# Patient Record
Sex: MALE | Race: WHITE | HISPANIC OR LATINO | Employment: OTHER | URBAN - METROPOLITAN AREA
[De-identification: names, ages, dates, MRNs, and addresses within clinical notes are randomized per-mention and may not be internally consistent; named-entity substitution may affect disease eponyms.]

---

## 2017-05-24 ENCOUNTER — HOSPITAL ENCOUNTER (EMERGENCY)
Facility: HOSPITAL | Age: 32
Discharge: HOME | End: 2017-05-24
Attending: EMERGENCY MEDICINE

## 2017-05-24 VITALS
WEIGHT: 162 LBS | TEMPERATURE: 98 F | HEIGHT: 67 IN | OXYGEN SATURATION: 98 % | SYSTOLIC BLOOD PRESSURE: 126 MMHG | RESPIRATION RATE: 20 BRPM | HEART RATE: 78 BPM | BODY MASS INDEX: 25.43 KG/M2 | DIASTOLIC BLOOD PRESSURE: 82 MMHG

## 2017-05-24 DIAGNOSIS — S81.819A LACERATION OF LEG: Primary | ICD-10-CM

## 2017-05-24 PROCEDURE — 25000003 PHARM REV CODE 250: Performed by: PHYSICIAN ASSISTANT

## 2017-05-24 PROCEDURE — 12032 INTMD RPR S/A/T/EXT 2.6-7.5: CPT

## 2017-05-24 PROCEDURE — 12002 RPR S/N/AX/GEN/TRNK2.6-7.5CM: CPT

## 2017-05-24 PROCEDURE — 63600175 PHARM REV CODE 636 W HCPCS: Performed by: PHYSICIAN ASSISTANT

## 2017-05-24 PROCEDURE — 90471 IMMUNIZATION ADMIN: CPT | Performed by: PHYSICIAN ASSISTANT

## 2017-05-24 PROCEDURE — 99283 EMERGENCY DEPT VISIT LOW MDM: CPT | Mod: 25

## 2017-05-24 PROCEDURE — 90715 TDAP VACCINE 7 YRS/> IM: CPT | Performed by: PHYSICIAN ASSISTANT

## 2017-05-24 RX ORDER — HYDROCODONE BITARTRATE AND ACETAMINOPHEN 5; 325 MG/1; MG/1
1 TABLET ORAL EVERY 4 HOURS PRN
Qty: 12 TABLET | Refills: 0 | Status: SHIPPED | OUTPATIENT
Start: 2017-05-24 | End: 2017-05-31

## 2017-05-24 RX ORDER — HYDROCODONE BITARTRATE AND ACETAMINOPHEN 5; 325 MG/1; MG/1
1 TABLET ORAL
Status: COMPLETED | OUTPATIENT
Start: 2017-05-24 | End: 2017-05-24

## 2017-05-24 RX ORDER — CEPHALEXIN 500 MG/1
500 CAPSULE ORAL EVERY 12 HOURS
Qty: 20 CAPSULE | Refills: 0 | Status: SHIPPED | OUTPATIENT
Start: 2017-05-24 | End: 2017-05-29

## 2017-05-24 RX ORDER — LIDOCAINE HYDROCHLORIDE 10 MG/ML
10 INJECTION INFILTRATION; PERINEURAL
Status: COMPLETED | OUTPATIENT
Start: 2017-05-24 | End: 2017-05-24

## 2017-05-24 RX ADMIN — LIDOCAINE HYDROCHLORIDE 10 ML: 10 INJECTION, SOLUTION INFILTRATION; PERINEURAL at 11:05

## 2017-05-24 RX ADMIN — CLOSTRIDIUM TETANI TOXOID ANTIGEN (FORMALDEHYDE INACTIVATED), CORYNEBACTERIUM DIPHTHERIAE TOXOID ANTIGEN (FORMALDEHYDE INACTIVATED), BORDETELLA PERTUSSIS TOXOID ANTIGEN (GLUTARALDEHYDE INACTIVATED), BORDETELLA PERTUSSIS FILAMENTOUS HEMAGGLUTININ ANTIGEN (FORMALDEHYDE INACTIVATED), BORDETELLA PERTUSSIS PERTACTIN ANTIGEN, AND BORDETELLA PERTUSSIS FIMBRIAE 2/3 ANTIGEN 0.5 ML: 5; 2; 2.5; 5; 3; 5 INJECTION, SUSPENSION INTRAMUSCULAR at 11:05

## 2017-05-24 RX ADMIN — HYDROCODONE BITARTRATE AND ACETAMINOPHEN 1 TABLET: 5; 325 TABLET ORAL at 11:05

## 2017-05-24 NOTE — ED PROVIDER NOTES
Encounter Date: 5/24/2017       History     Chief Complaint   Patient presents with    Laceration     pt has large laceration noted to R medial knee from knife.       Review of patient's allergies indicates:  No Known Allergies      Ernst Su 31 y.o. male with no past medical history presented to the ED with c/o laceration to the right lower extremity that occurred just prior to arrival.  He reports that he was using a knife to cut materials at work.  He states that the knife was new and clean.  He reports some bleeding at the site that was controlled prior to arrival.  He denies any numbness, tingling, pain radiation, decreased range of motion.       The history is provided by the patient. The history is limited by a language barrier. A  was used.     History reviewed. No pertinent past medical history.  History reviewed. No pertinent surgical history.  History reviewed. No pertinent family history.  Social History   Substance Use Topics    Smoking status: Current Some Day Smoker    Smokeless tobacco: Not on file    Alcohol use Not on file     Review of Systems   Constitutional: Negative for chills and fever.   Respiratory: Negative for shortness of breath.    Cardiovascular: Negative for chest pain and leg swelling.   Musculoskeletal: Negative for arthralgias, back pain, joint swelling and myalgias.   Skin: Positive for wound. Negative for rash.        Laceration to the right medial knee   Neurological: Negative for weakness and numbness.   Hematological: Negative for adenopathy. Does not bruise/bleed easily.       Physical Exam     Initial Vitals [05/24/17 1112]   BP Pulse Resp Temp SpO2   127/80 78 16 97.7 °F (36.5 °C) 100 %     Physical Exam    Nursing note and vitals reviewed.  Constitutional: Vital signs are normal. He appears well-developed and well-nourished. He is cooperative.  Non-toxic appearance. He does not appear ill. No distress.   HENT:   Head: Normocephalic and  atraumatic.   Eyes: Conjunctivae and lids are normal.   Neck: Normal range of motion. Neck supple.   Cardiovascular: Normal rate and regular rhythm.   Abdominal: Soft. Normal appearance.   Musculoskeletal: Normal range of motion.        Right knee: He exhibits laceration. He exhibits normal range of motion, no swelling, no effusion, no ecchymosis, no erythema, no LCL laxity, normal patellar mobility, no bony tenderness and no MCL laxity.        Legs:  3 cm linear gaping laceration to the right medial knee with well approximated edges, no foreign body, no tendon, muscle or  obvious joint involvement at this time.  No active bleeding   Neurological: He is alert and oriented to person, place, and time. He has normal strength. No sensory deficit. GCS eye subscore is 4. GCS verbal subscore is 5. GCS motor subscore is 6.   Skin: Skin is warm, dry and intact. No rash noted. No erythema.   Psychiatric: He has a normal mood and affect. His speech is normal and behavior is normal. Thought content normal.         ED Course   Lac Repair  Date/Time: 5/24/2017 2:14 PM  Performed by: SHAYNE WITT  Authorized by: SALUD CROSS   Consent Done: Yes  Consent given by: patient  Patient understanding: patient states understanding of the procedure being performed  Patient identity confirmed: verbally with patient  Body area: lower extremity  Location details: right knee  Laceration length: 3 cm  Foreign bodies: no foreign bodies  Tendon involvement: none  Nerve involvement: none  Anesthesia: local infiltration    Anesthesia:  Anesthesia: local infiltration  Local Anesthetic: lidocaine 1% without epinephrine   Anesthetic total: 4 mL  Patient sedated: no  Preparation: Patient was prepped and draped in the usual sterile fashion.  Irrigation solution: saline  Irrigation method: syringe  Amount of cleaning: standard  Debridement: none  Degree of undermining: none  Skin closure: Ethilon (4-0)  Subcutaneous closure: 4-0 Vicryl  Number  of sutures: 15  Technique: simple  Approximation: close  Approximation difficulty: simple  Dressing: 4x4 sterile gauze  Patient tolerance: Patient tolerated the procedure well with no immediate complications        Labs Reviewed - No data to display      Imaging Results          X-Ray Knee 3 View Right (Final result)  Result time 05/24/17 12:06:01    Final result by Jeanie Salazar MD (05/24/17 12:06:01)                 Impression:      No acute osseous abnormality identified.      Electronically signed by: JEANIE SALAZAR MD  Date:     05/24/17  Time:    12:06              Narrative:    Right knee 3 views.  Comparison: None.    No evidence of fracture, dislocation, or osseous destructive process.  Joint spaces are maintained.  Small suprapatellar joint effusion visualized.                                  Ernst Su 31 y.o. male with no past medical history presented to the ED with c/o laceration to the right lower extremity that occurred just prior to arrival.  He reports that he was using a knife to cut materials at work.  He states that the knife was new and clean.  He reports some bleeding at the site that was controlled prior to arrival.  He denies any numbness, tingling, pain radiation, decreased range of motion.  He did not take any medications for the pain.  He is not up-to-date with tetanus.  ROS positive for laceration.  Physical exam reveals patient in minimal distress with 3 cm laceration on the right superior, medial knee with no active bleeding, tendon or joint involvement and well approximated edges. FROM of all joints on affected extremities, sensation and capillary refill intact.     DDX: laceration simple versus complicated, intra-articular laceration    ED management: wound cleaned and irrigated, lac repair; see procedure note.  Norco with reduction of pain.  X-ray with no obvious articular involvement, no visualized tendon on exam and full range of motion of the knee with no serous drainage.   Tetanus Updated in the ED.  We will send home on prophylactic antibiotics due to the size and location of the laceration.  He was instructed to keep the area clean and dry.        Impression/Plan: The encounter diagnosis was Laceration of leg.  Patient will follow up with Primaryfor suture removal .  Patient cautioned on when to return to ED.  Pt. Understands and agrees with current treatment plan                       Attending Attestation:     Physician Attestation Statement for NP/PA:   I discussed this assessment and plan of this patient with the NP/PA, but I did not personally examine the patient. The face to face encounter was performed by the NP/PA.    Other NP/PA Attestation Additions:      Medical Decision Making: Pt presented with leg laceration. No joint involvement per APC exam. Lac repaired by APC without complication. Sutures to be removed by PCP in 10 days or return for any sign of infection.                 ED Course     Clinical Impression:   The encounter diagnosis was Laceration of leg.    Disposition:   Disposition: Discharged  Condition: Stable       ROGER Mckeon  05/24/17 1411       Merlin Cerda MD  05/28/17 3714

## 2017-05-24 NOTE — ED NOTES
Pt brought to room, bleeding to right knee in controlled at this time. Savana DURAN in to evaluate.

## 2017-05-24 NOTE — ED NOTES
Pt given prescriptions and discharge instructions. Verbalizes understanding. Will get antibiotic filled today. Will return in 7 days to get his stitches out.

## 2017-05-31 ENCOUNTER — HOSPITAL ENCOUNTER (EMERGENCY)
Facility: HOSPITAL | Age: 32
Discharge: HOME | End: 2017-05-31
Attending: EMERGENCY MEDICINE

## 2017-05-31 VITALS
TEMPERATURE: 98 F | SYSTOLIC BLOOD PRESSURE: 116 MMHG | HEIGHT: 67 IN | WEIGHT: 162 LBS | HEART RATE: 65 BPM | RESPIRATION RATE: 16 BRPM | OXYGEN SATURATION: 100 % | DIASTOLIC BLOOD PRESSURE: 64 MMHG | BODY MASS INDEX: 25.43 KG/M2

## 2017-05-31 DIAGNOSIS — Z51.89 ENCOUNTER FOR WOUND RE-CHECK: Primary | ICD-10-CM

## 2017-05-31 PROCEDURE — 99283 EMERGENCY DEPT VISIT LOW MDM: CPT

## 2017-05-31 NOTE — ED PROVIDER NOTES
Encounter Date: 5/31/2017       History     Chief Complaint   Patient presents with    Suture / Staple Removal     left leg     Review of patient's allergies indicates:  No Known Allergies  31-year-old male is here for suture removal.  Patient had 12 sutures placed in this ED 7 days ago.  Patient reports he has been taking his oral antibiotics as directed.  He has had no problems with the wound.  He reports the pain has improved and he is an note drainage from the wound.      The history is provided by the patient.   Wound Check    He was treated in the ED 5 to 10 days ago. Treatments since wound repair include oral antibiotics and regular peroxide and water cleansings. There has been no drainage from the wound. There is no redness present. There is no swelling present. The pain has no pain. He has no difficulty moving the affected extremity or digit.     History reviewed. No pertinent past medical history.  History reviewed. No pertinent surgical history.  No family history on file.  Social History   Substance Use Topics    Smoking status: Current Some Day Smoker    Smokeless tobacco: Not on file    Alcohol use Not on file     Review of Systems   Constitutional: Negative for chills and fever.   HENT: Negative for congestion.    Respiratory: Negative for cough.    Cardiovascular: Negative for chest pain.   Gastrointestinal: Negative for abdominal pain, diarrhea, nausea and vomiting.   Musculoskeletal: Negative for arthralgias, back pain, joint swelling, myalgias and neck pain.   Skin: Positive for wound.   Allergic/Immunologic: Negative for immunocompromised state.   Neurological: Negative for weakness and headaches.   Psychiatric/Behavioral: Negative for confusion.   All other systems reviewed and are negative.      Physical Exam     Initial Vitals [05/31/17 1521]   BP Pulse Resp Temp SpO2   116/64 65 16 97.8 °F (36.6 °C) 100 %     Physical Exam    Nursing note and vitals reviewed.  Constitutional: Vital signs  are normal. He appears well-developed and well-nourished. He is active and cooperative. He is easily aroused.  Non-toxic appearance. He does not have a sickly appearance. He does not appear ill. No distress.   HENT:   Head: Normocephalic and atraumatic.   Eyes: Conjunctivae are normal.   Neck: Normal range of motion.   Cardiovascular: Normal rate.   Pulmonary/Chest: Effort normal.   Abdominal: Soft. Normal appearance and bowel sounds are normal. There is no tenderness.   Neurological: He is alert, oriented to person, place, and time and easily aroused. He has normal strength. No sensory deficit. GCS eye subscore is 4. GCS verbal subscore is 5. GCS motor subscore is 6.   Skin: Skin is warm and dry. Laceration noted. No rash noted.   Sutured laceration to right inner thigh.  Wound without signs of infection or drainage.  No tenderness to palpation.   Psychiatric: He has a normal mood and affect. His speech is normal and behavior is normal. Judgment and thought content normal. Cognition and memory are normal.         ED Course   Suture Removal  Date/Time: 5/31/2017 4:10 PM  Location procedure was performed: [facility] EMERGENCY DEPARTMENT  Performed by: MAVERICK CASTILLO  Authorized by: LEFORT, GUY J.   Body area: lower extremity  Location details: right upper leg  Description of findings: Upon removal of two sutures, wound dehisced to superficial laceration.    Wound Appearance: clean, well healed, normal color, nontender and no drainage  Sutures Removed: 2  Post-removal: Steri-Strips applied and dressing applied  Facility: sutures placed in this facility  Complications: No  Estimated blood loss (mL): 0  Specimens: No  Implants: No  Patient tolerance: Patient tolerated the procedure well with no immediate complications  Comments: Advised leaving the sutures in for two additional days to allow for complete healing.  Remove the 10 additional sutures in 2 days. Advised continued suture care.          Labs Reviewed - No  data to display          Medical Decision Making:   Initial Assessment:   31-year-old male here for suture removal.  Patient is on antibiotics, take as directed.  No signs of infection at wound.  Wound appears well-healed.  Differential Diagnosis:   Wound check, suture removal, infection  ED Management:  2 stitches removed and wound dehisced minimally.  Advised leaving the remaining sutures in for an additional 2 days.    Advise wound recheck in 2 days for likely suture removal.  Advised continued wound care.  Advised continued use of the antibiotics as directed.  Reviewed signs and symptoms of infection.  Patient verbalized understanding, compliance, and agreement with the treatment plan.                   ED Course     Clinical Impression:   The encounter diagnosis was Encounter for wound re-check.          Kira Logan, CAITLIN  05/31/17 3415

## 2017-06-02 ENCOUNTER — HOSPITAL ENCOUNTER (EMERGENCY)
Facility: HOSPITAL | Age: 32
Discharge: HOME | End: 2017-06-02
Attending: EMERGENCY MEDICINE

## 2017-06-02 VITALS
DIASTOLIC BLOOD PRESSURE: 67 MMHG | SYSTOLIC BLOOD PRESSURE: 119 MMHG | OXYGEN SATURATION: 99 % | HEART RATE: 76 BPM | RESPIRATION RATE: 16 BRPM | TEMPERATURE: 98 F

## 2017-06-02 DIAGNOSIS — S81.811A LEG LACERATION, RIGHT, INITIAL ENCOUNTER: ICD-10-CM

## 2017-06-02 DIAGNOSIS — Z48.02 VISIT FOR SUTURE REMOVAL: Primary | ICD-10-CM

## 2017-06-02 PROCEDURE — 99281 EMR DPT VST MAYX REQ PHY/QHP: CPT

## 2017-06-02 RX ORDER — HYDROCODONE BITARTRATE AND ACETAMINOPHEN 5; 325 MG/1; MG/1
1 TABLET ORAL EVERY 6 HOURS PRN
COMMUNITY

## 2017-06-02 NOTE — ED NOTES
Pt has sutures noted to R medial leg, near knee.  No drainage or swelling noted to site.  Dr. Castellanos at bedside to remove sutures.

## 2017-06-03 NOTE — ED PROVIDER NOTES
Chief complaint:  Suture / Staple Removal (R inner knee)      HPI:  Ernst Su is a 31 y.o. male presenting with  acute onset of having had a laceration to his right leg after he actually cut himself with a .  He is here for suture removal.  No fevers or chills.  No redness or swelling.    ROS: As per HPI and below:  Constitutional:  No fevers, no chills  Skin: Laceration to right leg  Heme: no bleeding  Musculoskeletal: no joint pain  Neuro: no focal numbness, no focal weakness        Review of patient's allergies indicates:  No Known Allergies    No current facility-administered medications on file prior to encounter.      No current outpatient prescriptions on file prior to encounter.       PMH:  As per HPI and below:  History reviewed. No pertinent past medical history.  History reviewed. No pertinent surgical history.    Social History     Social History    Marital status: Single     Spouse name: N/A    Number of children: N/A    Years of education: N/A     Social History Main Topics    Smoking status: Current Some Day Smoker    Smokeless tobacco: None    Alcohol use None    Drug use: Unknown    Sexual activity: Not Asked     Other Topics Concern    None     Social History Narrative    None       History reviewed. No pertinent family history.    Physical Exam:    Vitals:    06/02/17 1619   BP: 119/67   Pulse: 76   Resp: 16   Temp: 98.1 °F (36.7 °C)     Constitutional: Well-nourished, well-developed, in no acute distress, not cachectic  Musculoskeletal: Normal range of motion, no obvious deformity, normal capillary refill, head atraumatic, neck supple, no meningismus  Skin: 6 cm laceration to right distal medial thigh that is well-healed without erythema or exudate, sutures removed without difficulty.  Neurologic: Cranial nerves II through XII intact, no motor deficits, no sensory deficits, no cerebellar deficits  Psychological: Alert, oriented x3, normal affect, normal mood    No orders of  the defined types were placed in this encounter.      Medications - No data to display      Labs Reviewed - No data to display                ASSESSMENT  1. Visit for suture removal    2. Leg laceration, right, initial encounter          Disposition:    Discharge    Discharge Medication List as of 6/2/2017  4:40 PM        Discharge Medication List as of 6/2/2017  4:40 PM        Discharge Medication List as of 6/2/2017  4:40 PM        MDM  Number of Diagnoses or Management Options  Leg laceration, right, initial encounter:   Visit for suture removal:   Diagnosis management comments: Patient presents for suture removal of right leg.  Laceration is well-healed.  Sutures removed without difficulty.  We'll discharge home       Amount and/or Complexity of Data Reviewed  Decide to obtain previous medical records or to obtain history from someone other than the patient: yes           Murray Castellanos III, MD  06/02/17 3743

## 2017-07-22 ENCOUNTER — HOSPITAL ENCOUNTER (INPATIENT)
Facility: HOSPITAL | Age: 32
LOS: 1 days | Discharge: HOME | DRG: 683 | End: 2017-07-23
Attending: EMERGENCY MEDICINE | Admitting: HOSPITALIST

## 2017-07-22 DIAGNOSIS — N17.9 AKI (ACUTE KIDNEY INJURY): Primary | ICD-10-CM

## 2017-07-22 DIAGNOSIS — R11.10 VOMITING, INTRACTABILITY OF VOMITING NOT SPECIFIED, PRESENCE OF NAUSEA NOT SPECIFIED, UNSPECIFIED VOMITING TYPE: ICD-10-CM

## 2017-07-22 DIAGNOSIS — R10.13 EPIGASTRIC PAIN: ICD-10-CM

## 2017-07-22 PROBLEM — R10.9 ABDOMINAL PAIN: Status: ACTIVE | Noted: 2017-07-22

## 2017-07-22 PROBLEM — E87.1 HYPONATREMIA: Status: ACTIVE | Noted: 2017-07-22

## 2017-07-22 PROBLEM — E87.8 HYPOCHLOREMIA: Status: ACTIVE | Noted: 2017-07-22

## 2017-07-22 PROBLEM — R74.8 ELEVATED CPK: Status: ACTIVE | Noted: 2017-07-22

## 2017-07-22 PROBLEM — R11.2 INTRACTABLE VOMITING WITH NAUSEA: Status: ACTIVE | Noted: 2017-07-22

## 2017-07-22 LAB
ALBUMIN SERPL BCP-MCNC: 5.2 G/DL
ALP SERPL-CCNC: 75 U/L
ALT SERPL W/O P-5'-P-CCNC: 13 U/L
ANION GAP SERPL CALC-SCNC: 12 MMOL/L
ANION GAP SERPL CALC-SCNC: 22 MMOL/L
APTT BLDCRRT: 30.1 SEC
AST SERPL-CCNC: 20 U/L
BACTERIA #/AREA URNS HPF: ABNORMAL /HPF
BASOPHILS # BLD AUTO: 0.04 K/UL
BASOPHILS NFR BLD: 0.5 %
BILIRUB SERPL-MCNC: 1.5 MG/DL
BILIRUB UR QL STRIP: NEGATIVE
BUN SERPL-MCNC: 110 MG/DL
BUN SERPL-MCNC: 66 MG/DL
CALCIUM SERPL-MCNC: 10.5 MG/DL
CALCIUM SERPL-MCNC: 9.8 MG/DL
CHLORIDE SERPL-SCNC: 72 MMOL/L
CHLORIDE SERPL-SCNC: 86 MMOL/L
CK SERPL-CCNC: 204 U/L
CLARITY UR: CLEAR
CO2 SERPL-SCNC: 31 MMOL/L
CO2 SERPL-SCNC: 32 MMOL/L
COLOR UR: YELLOW
CREAT SERPL-MCNC: 2.9 MG/DL
CREAT SERPL-MCNC: 7.4 MG/DL
CREAT UR-MCNC: 92.1 MG/DL
DIFFERENTIAL METHOD: ABNORMAL
EOSINOPHIL # BLD AUTO: 0 K/UL
EOSINOPHIL NFR BLD: 0.2 %
ERYTHROCYTE [DISTWIDTH] IN BLOOD BY AUTOMATED COUNT: 12.5 %
EST. GFR  (AFRICAN AMERICAN): 10 ML/MIN/1.73 M^2
EST. GFR  (AFRICAN AMERICAN): 32 ML/MIN/1.73 M^2
EST. GFR  (NON AFRICAN AMERICAN): 28 ML/MIN/1.73 M^2
EST. GFR  (NON AFRICAN AMERICAN): 9 ML/MIN/1.73 M^2
ESTIMATED AVG GLUCOSE: 94 MG/DL
GLUCOSE SERPL-MCNC: 109 MG/DL
GLUCOSE SERPL-MCNC: 95 MG/DL
GLUCOSE UR QL STRIP: NEGATIVE
GRAN CASTS #/AREA URNS LPF: 1 /LPF
HBA1C MFR BLD HPLC: 4.9 %
HCT VFR BLD AUTO: 48.2 %
HGB BLD-MCNC: 17.5 G/DL
HGB UR QL STRIP: ABNORMAL
HYALINE CASTS #/AREA URNS LPF: 5 /LPF
INR PPP: 0.9
KETONES UR QL STRIP: NEGATIVE
LEUKOCYTE ESTERASE UR QL STRIP: NEGATIVE
LIPASE SERPL-CCNC: 30 U/L
LYMPHOCYTES # BLD AUTO: 1.9 K/UL
LYMPHOCYTES NFR BLD: 23.7 %
MAGNESIUM SERPL-MCNC: 2.7 MG/DL
MCH RBC QN AUTO: 31.5 PG
MCHC RBC AUTO-ENTMCNC: 36.3 G/DL
MCV RBC AUTO: 87 FL
MICROSCOPIC COMMENT: ABNORMAL
MONOCYTES # BLD AUTO: 1.2 K/UL
MONOCYTES NFR BLD: 15.1 %
NEUTROPHILS # BLD AUTO: 4.8 K/UL
NEUTROPHILS NFR BLD: 60.4 %
NITRITE UR QL STRIP: NEGATIVE
PH UR STRIP: 6 [PH] (ref 5–8)
PHOSPHATE SERPL-MCNC: 4.2 MG/DL
PLATELET # BLD AUTO: 406 K/UL
PMV BLD AUTO: 9.3 FL
POTASSIUM SERPL-SCNC: 3.6 MMOL/L
POTASSIUM SERPL-SCNC: 4 MMOL/L
PROT SERPL-MCNC: 9.8 G/DL
PROT UR QL STRIP: ABNORMAL
PROT UR-MCNC: 17 MG/DL
PROT/CREAT RATIO, UR: 0.18
PROTHROMBIN TIME: 10.1 SEC
RBC # BLD AUTO: 5.56 M/UL
RBC #/AREA URNS HPF: 1 /HPF (ref 0–4)
SODIUM SERPL-SCNC: 125 MMOL/L
SODIUM SERPL-SCNC: 130 MMOL/L
SODIUM UR-SCNC: <20 MMOL/L
SODIUM UR-SCNC: <20 MMOL/L
SP GR UR STRIP: 1.01 (ref 1–1.03)
SQUAMOUS #/AREA URNS HPF: 0 /HPF
TROPONIN I SERPL DL<=0.01 NG/ML-MCNC: <0.006 NG/ML
TROPONIN I SERPL DL<=0.01 NG/ML-MCNC: <0.006 NG/ML
TSH SERPL DL<=0.005 MIU/L-ACNC: 0.68 UIU/ML
URN SPEC COLLECT METH UR: ABNORMAL
UROBILINOGEN UR STRIP-ACNC: NEGATIVE EU/DL
WBC # BLD AUTO: 8.02 K/UL
WBC #/AREA URNS HPF: 1 /HPF (ref 0–5)
YEAST URNS QL MICRO: ABNORMAL

## 2017-07-22 PROCEDURE — 85730 THROMBOPLASTIN TIME PARTIAL: CPT

## 2017-07-22 PROCEDURE — 83735 ASSAY OF MAGNESIUM: CPT

## 2017-07-22 PROCEDURE — 84484 ASSAY OF TROPONIN QUANT: CPT | Mod: 91

## 2017-07-22 PROCEDURE — 85610 PROTHROMBIN TIME: CPT

## 2017-07-22 PROCEDURE — 84300 ASSAY OF URINE SODIUM: CPT

## 2017-07-22 PROCEDURE — 93005 ELECTROCARDIOGRAM TRACING: CPT

## 2017-07-22 PROCEDURE — 80048 BASIC METABOLIC PNL TOTAL CA: CPT

## 2017-07-22 PROCEDURE — 25000003 PHARM REV CODE 250: Performed by: EMERGENCY MEDICINE

## 2017-07-22 PROCEDURE — 86335 IMMUNFIX E-PHORSIS/URINE/CSF: CPT

## 2017-07-22 PROCEDURE — 83690 ASSAY OF LIPASE: CPT

## 2017-07-22 PROCEDURE — 94761 N-INVAS EAR/PLS OXIMETRY MLT: CPT

## 2017-07-22 PROCEDURE — 84100 ASSAY OF PHOSPHORUS: CPT

## 2017-07-22 PROCEDURE — 84156 ASSAY OF PROTEIN URINE: CPT

## 2017-07-22 PROCEDURE — 25000003 PHARM REV CODE 250: Performed by: INTERNAL MEDICINE

## 2017-07-22 PROCEDURE — 94760 N-INVAS EAR/PLS OXIMETRY 1: CPT

## 2017-07-22 PROCEDURE — 25000003 PHARM REV CODE 250: Performed by: NURSE PRACTITIONER

## 2017-07-22 PROCEDURE — 99285 EMERGENCY DEPT VISIT HI MDM: CPT | Mod: 25

## 2017-07-22 PROCEDURE — 63600175 PHARM REV CODE 636 W HCPCS: Performed by: EMERGENCY MEDICINE

## 2017-07-22 PROCEDURE — 84484 ASSAY OF TROPONIN QUANT: CPT

## 2017-07-22 PROCEDURE — 81000 URINALYSIS NONAUTO W/SCOPE: CPT

## 2017-07-22 PROCEDURE — 85025 COMPLETE CBC W/AUTO DIFF WBC: CPT

## 2017-07-22 PROCEDURE — 36415 COLL VENOUS BLD VENIPUNCTURE: CPT

## 2017-07-22 PROCEDURE — 83036 HEMOGLOBIN GLYCOSYLATED A1C: CPT

## 2017-07-22 PROCEDURE — 96361 HYDRATE IV INFUSION ADD-ON: CPT

## 2017-07-22 PROCEDURE — 82550 ASSAY OF CK (CPK): CPT

## 2017-07-22 PROCEDURE — 80053 COMPREHEN METABOLIC PANEL: CPT

## 2017-07-22 PROCEDURE — 11000001 HC ACUTE MED/SURG PRIVATE ROOM

## 2017-07-22 PROCEDURE — 96374 THER/PROPH/DIAG INJ IV PUSH: CPT

## 2017-07-22 PROCEDURE — 84443 ASSAY THYROID STIM HORMONE: CPT

## 2017-07-22 RX ORDER — FAMOTIDINE 20 MG/1
20 TABLET, FILM COATED ORAL
Status: COMPLETED | OUTPATIENT
Start: 2017-07-22 | End: 2017-07-22

## 2017-07-22 RX ORDER — FAMOTIDINE 20 MG/1
20 TABLET, FILM COATED ORAL 2 TIMES DAILY
Status: DISCONTINUED | OUTPATIENT
Start: 2017-07-22 | End: 2017-07-23 | Stop reason: HOSPADM

## 2017-07-22 RX ORDER — SODIUM CHLORIDE 9 MG/ML
INJECTION, SOLUTION INTRAVENOUS CONTINUOUS
Status: DISCONTINUED | OUTPATIENT
Start: 2017-07-22 | End: 2017-07-23

## 2017-07-22 RX ORDER — ONDANSETRON 2 MG/ML
4 INJECTION INTRAMUSCULAR; INTRAVENOUS EVERY 12 HOURS PRN
Status: DISCONTINUED | OUTPATIENT
Start: 2017-07-22 | End: 2017-07-23 | Stop reason: HOSPADM

## 2017-07-22 RX ORDER — ACETAMINOPHEN 325 MG/1
650 TABLET ORAL EVERY 6 HOURS PRN
Status: DISCONTINUED | OUTPATIENT
Start: 2017-07-22 | End: 2017-07-23 | Stop reason: HOSPADM

## 2017-07-22 RX ORDER — SODIUM CHLORIDE AND POTASSIUM CHLORIDE 150; 900 MG/100ML; MG/100ML
INJECTION, SOLUTION INTRAVENOUS CONTINUOUS
Status: DISCONTINUED | OUTPATIENT
Start: 2017-07-22 | End: 2017-07-23

## 2017-07-22 RX ORDER — MORPHINE SULFATE 2 MG/ML
2 INJECTION, SOLUTION INTRAMUSCULAR; INTRAVENOUS EVERY 4 HOURS PRN
Status: DISCONTINUED | OUTPATIENT
Start: 2017-07-22 | End: 2017-07-23 | Stop reason: HOSPADM

## 2017-07-22 RX ORDER — ONDANSETRON 2 MG/ML
8 INJECTION INTRAMUSCULAR; INTRAVENOUS
Status: COMPLETED | OUTPATIENT
Start: 2017-07-22 | End: 2017-07-22

## 2017-07-22 RX ADMIN — FAMOTIDINE 20 MG: 20 TABLET, FILM COATED ORAL at 05:07

## 2017-07-22 RX ADMIN — FAMOTIDINE 20 MG: 20 TABLET ORAL at 08:07

## 2017-07-22 RX ADMIN — SODIUM CHLORIDE 1000 ML: 0.9 INJECTION, SOLUTION INTRAVENOUS at 04:07

## 2017-07-22 RX ADMIN — SODIUM CHLORIDE AND POTASSIUM CHLORIDE: 9; 1.49 INJECTION, SOLUTION INTRAVENOUS at 04:07

## 2017-07-22 RX ADMIN — FAMOTIDINE 20 MG: 20 TABLET ORAL at 10:07

## 2017-07-22 RX ADMIN — ONDANSETRON 8 MG: 2 INJECTION INTRAMUSCULAR; INTRAVENOUS at 04:07

## 2017-07-22 RX ADMIN — LIDOCAINE HYDROCHLORIDE: 20 SOLUTION ORAL; TOPICAL at 05:07

## 2017-07-22 RX ADMIN — SODIUM CHLORIDE 1000 ML: 0.9 INJECTION, SOLUTION INTRAVENOUS at 05:07

## 2017-07-22 RX ADMIN — SODIUM CHLORIDE: 0.9 INJECTION, SOLUTION INTRAVENOUS at 08:07

## 2017-07-22 NOTE — ED PROVIDER NOTES
"Encounter Date: 7/22/2017       History     Chief Complaint   Patient presents with    Emesis     X 4 days. multiple times each day. States "I can't eat/ drink anything because it comes back up"; reports abd pain. reports vomiting has made pain worse.      History was obtained using a .    Patient is a 32-year-old  male with no significant past medical history who presents to emergency department for evaluation of midepigastric abdominal pain associated with nausea and vomiting for the past 4 days.  He denies any bloody emesis or coffee-ground emesis.  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  He feels like his symptoms started a few days ago and may be related to food.  He drinks 2 beers a day but denies any other excessive alcohol intake.  He has no history of similar symptoms.  He states he's been vomiting numerous times a day but denies any diarrhea.  He has no chest pain flank pain radiation of pain to the back.  He still making urine.  Patient states he has mild urinary hesitancy and that it burns when he urinates.  He states he's been vomiting approximately 15 times a day.          Review of patient's allergies indicates:  No Known Allergies  No past medical history on file.  No past surgical history on file.  No family history on file.  Social History   Substance Use Topics    Smoking status: Current Some Day Smoker    Smokeless tobacco: Not on file    Alcohol use Not on file     Review of Systems   Constitutional: Negative for fever.   HENT: Negative for ear pain, rhinorrhea and sore throat.    Eyes: Negative for pain and visual disturbance.   Respiratory: Negative for cough and shortness of breath.    Cardiovascular: Negative for chest pain.   Gastrointestinal: Positive for abdominal pain and vomiting. Negative for diarrhea and nausea.   Genitourinary: Positive for dysuria. Negative for difficulty urinating, flank pain, penile pain, " penile swelling and scrotal swelling.   Musculoskeletal: Negative for arthralgias, back pain and myalgias.   Skin: Negative for rash.   Neurological: Negative for weakness, numbness and headaches.   All other systems reviewed and are negative.      Physical Exam     Initial Vitals [07/22/17 0115]   BP Pulse Resp Temp SpO2   129/78 97 12 98.1 °F (36.7 °C) 99 %      MAP       95         Physical Exam    Nursing note and vitals reviewed.  Constitutional: He appears well-developed and well-nourished. No distress.   HENT:   Head: Normocephalic and atraumatic.   Mouth/Throat: Oropharynx is clear and moist.   Eyes: EOM are normal. Pupils are equal, round, and reactive to light.   Neck: Neck supple.   Cardiovascular: Normal rate, regular rhythm, normal heart sounds and intact distal pulses. Exam reveals no gallop and no friction rub.    No murmur heard.  Pulmonary/Chest: Breath sounds normal. He has no wheezes. He has no rhonchi. He has no rales.   Abdominal: Soft. Bowel sounds are normal. There is tenderness (midepigastric region). There is no rebound and no guarding.   Musculoskeletal: Normal range of motion. He exhibits no tenderness.   Neurological: He is alert and oriented to person, place, and time. He has normal strength. No sensory deficit.   Skin: Skin is warm and dry. Capillary refill takes less than 2 seconds.   Psychiatric: He has a normal mood and affect.         ED Course   Critical Care  Performed by: REJI RUBIO  Authorized by: REJI RUBIO   Direct patient critical care time: 20 minutes  Ordering / reviewing critical care time: 5 minutes  Documentation critical care time: 5 minutes  Consulting other physicians critical care time: 5 minutes  Total critical care time (exclusive of procedural time) : 35 minutes  Critical care was necessary to treat or prevent imminent or life-threatening deterioration of the following conditions: metabolic crisis.  Critical care was time spent personally by me on the  following activities: discussions with consultants, evaluation of patient's response to treatment, examination of patient, ordering and review of laboratory studies, ordering and review of radiographic studies, ordering and performing treatments and interventions and re-evaluation of patient's condition.        Labs Reviewed   CBC W/ AUTO DIFFERENTIAL - Abnormal; Notable for the following:        Result Value    MCH 31.5 (*)     MCHC 36.3 (*)     Platelets 406 (*)     Mono # 1.2 (*)     Mono% 15.1 (*)     All other components within normal limits   COMPREHENSIVE METABOLIC PANEL - Abnormal; Notable for the following:     Sodium 125 (*)     Chloride 72 (*)     CO2 31 (*)     BUN, Bld 110 (*)     Creatinine 7.4 (*)     Total Protein 9.8 (*)     Total Bilirubin 1.5 (*)     Anion Gap 22 (*)     eGFR if  10 (*)     eGFR if non  9 (*)     All other components within normal limits    Narrative:      CL critical result(s) called and verbal readback obtained from   Mary Newton RN, 07/22/2017 05:08   LIPASE             Medical Decision Making:   Patient has acute kidney insufficiency.  He does have mild dysuria and is only urinated 3 times since Tuesday according to the .  He has no signs of flank tenderness at this time.  I doubt this is urinary obstructive uropathy with HAL but rather dehydration with acute kidney insufficiency.  I'm awaiting urinalysis at this time.  I will get a bladder scan.  Patient will need to be admission for acute renal insufficiency.  5:54 AM postvoid residual was approximately 240.  I will place a Daily catheter.  Patient will likely need renal ultrasound and will be admitted to Hospital medicine.                   ED Course     Clinical Impression:   The primary encounter diagnosis was HAL (acute kidney injury). A diagnosis of Vomiting, intractability of vomiting not specified, presence of nausea not specified, unspecified vomiting type was also  pertinent to this visit.                           Andrés Boles MD  07/22/17 0554       Andrés Boles MD  08/14/17 0514

## 2017-07-22 NOTE — ASSESSMENT & PLAN NOTE
Hyponatremia, hypochloremia, Elevated CPK  He is dehydrated, as he works as  and has been unable to drinks fluids d/t N/V for 4 days.  He noticed and acute decrease in urine volume, urinating only once per day for the past 3 days. He denies Flank Pian, so likely not hydronephrosis. He did have a Post Void Residual of 240. So serna was placed in the ED.  Sodium 125, Chloride 72,  and Creatine 7.4, , tBili 1.5.   Urinalysis does not show signs of UTI, but has 1+ Protein with Hylan and Granular Casts.  --He was given 2 liters Normal Saline. Continue @ 150 ml/hr  --Renal US  --Nephrology Consult  --Strict Intake and Output  --Avoid nephrotoxins and renally dose meds

## 2017-07-22 NOTE — ED NOTES
at bedside for translation. Educated pt. on medication indication, AE, SE, reactions and expected outcomes. Pt. verbalizes understanding. Agrees to treatment plan. Denies allergy. Will continue to monitor.

## 2017-07-22 NOTE — CONSULTS
NEPHROLOGY CONSULT NOTE    HPI & INTERVAL HISTORY:    History reviewed. No pertinent past medical history.   History reviewed. No pertinent surgical history.   Review of patient's allergies indicates:  No Known Allergies   Prescriptions Prior to Admission   Medication Sig Dispense Refill Last Dose    hydrocodone-acetaminophen 5-325mg (NORCO) 5-325 mg per tablet Take 1 tablet by mouth every 6 (six) hours as needed for Pain.          Social History     Social History    Marital status: Single     Spouse name: N/A    Number of children: N/A    Years of education: N/A     Occupational History    Not on file.     Social History Main Topics    Smoking status: Current Some Day Smoker    Smokeless tobacco: Not on file    Alcohol use Not on file    Drug use: Unknown    Sexual activity: Not on file     Other Topics Concern    Not on file     Social History Narrative    No narrative on file        MEDS   famotidine  20 mg Oral BID          CONTINOUS INFUSIONS:      Intake/Output Summary (Last 24 hours) at 07/22/17 1730  Last data filed at 07/22/17 1244   Gross per 24 hour   Intake                0 ml   Output             2550 ml   Net            -2550 ml        HEMODYNAMICS:    Temp:  [98 °F (36.7 °C)-98.3 °F (36.8 °C)] 98 °F (36.7 °C)  Pulse:  [73-98] 73  Resp:  [12-18] 18  SpO2:  [98 %-100 %] 98 %  BP: (118-136)/(66-79) 122/76     Admitted with nausea, vomiting, abdominal pain, decreased intake.  On exam : no nausea, no vomiting, no diarrhea, no fever, no chills,  No CP, no SOB, no cough, no dysuria.   Gen: NAD.  Cards: Pulse 73.  /76.  Pul: diminished breath sounds.  Abdomen soft.  Ext: no edema .  Skin: dry.   Asterixis negative  LABS   Lab Results   Component Value Date    WBC 8.02 07/22/2017    HGB 17.5 07/22/2017    HCT 48.2 07/22/2017    MCV 87 07/22/2017     (H) 07/22/2017          Recent Labs  Lab 07/22/17  0419 07/22/17  1242    95   CALCIUM 10.5 9.8   ALBUMIN 5.2  --    PROT 9.8*   --    * 130*   K 4.0 3.6   CO2 31* 32*   CL 72* 86*   * 66*   CREATININE 7.4* 2.9*   ALKPHOS 75  --    ALT 13  --    AST 20  --    BILITOT 1.5*  --       Lab Results   Component Value Date    CALCIUM 9.8 07/22/2017    PHOS 4.2 07/22/2017      No results found for: IRON, TIBC, FERRITIN     ABG  No results for input(s): PH, PO2, PCO2, HCO3, BE in the last 168 hours.      IMAGING:  CXR    ASSESSMENT / PLAN  Acute kidney injury.  US :  Right:       Size: 10.7 cm       Cortex: Normal cortical thickness.  Preserved corticomedullary differentiation. No solid renal masses.       Pelvocaliectasis: None.       Calculi: None.       Perfusion: Adequate.            Resistive index: 0.65  Left:       Size: 12.3 cm       Cortex: Normal cortical thickness.  Preserved corticomedullary differentiation. No solid renal masses.       Pelvocaliectasis: None.       Calculi: None.       Perfusion: Adequate.            Resistive index: 0.62  Urinary bladder  is decompressed around a catheter    UA protein 1+, blood 1+, RBC 1+, Na < 20, protein 17..  Creatinine 7.4.  Improving with IVF.  Repeat creatinine 2.9, BUN 66.  Hyponatremia.  Metabolic alkalosis.  Hb 17.5  Albumin 5.2.  /76.  Continue IVF.  Weighty daily.  I and O.  Avoid nephrotoxic agents, hypotension, hypovolemia.

## 2017-07-22 NOTE — ED NOTES
line used for translation. Pt. ambulatory to ED room with steady gait. Reports n.v x4-5 days. Denies abd pain. Denies diarrhea. Reports a brief episode of anxiety, SOB and chest tightness while vomiting. Denies back pain. Reports dysuria and hesitancy. Denies urinary frequency. Denies flank pain. Denies urinary frequency. Denies CP and SOB at present time. Denies allergies. Denies taking medication on daily basis. AAOx4. NAD.

## 2017-07-22 NOTE — SUBJECTIVE & OBJECTIVE
History reviewed. No pertinent past medical history.    History reviewed. No pertinent surgical history.    Review of patient's allergies indicates:  No Known Allergies    No current facility-administered medications on file prior to encounter.      Current Outpatient Prescriptions on File Prior to Encounter   Medication Sig    hydrocodone-acetaminophen 5-325mg (NORCO) 5-325 mg per tablet Take 1 tablet by mouth every 6 (six) hours as needed for Pain.     Family History     None        Social History Main Topics    Smoking status: Current Some Day Smoker    Smokeless tobacco: Not on file    Alcohol use Not on file    Drug use: Unknown    Sexual activity: Not on file     Review of Systems   Constitutional: Positive for appetite change. Negative for chills, diaphoresis, fever and unexpected weight change.   HENT: Negative for congestion, sore throat, trouble swallowing and voice change.    Eyes: Negative for photophobia and visual disturbance.   Respiratory: Negative for cough, shortness of breath and wheezing.    Cardiovascular: Negative for chest pain, palpitations and leg swelling.   Gastrointestinal: Positive for abdominal pain, nausea and vomiting. Negative for abdominal distention, blood in stool, constipation and diarrhea.   Endocrine: Negative for polydipsia, polyphagia and polyuria.   Genitourinary: Positive for decreased urine volume and difficulty urinating. Negative for hematuria and urgency.   Musculoskeletal: Negative for back pain, joint swelling, neck pain and neck stiffness.   Skin: Negative for color change, rash and wound.   Neurological: Negative for speech difficulty, weakness and headaches.   Psychiatric/Behavioral: Negative for agitation, decreased concentration and sleep disturbance. The patient is not nervous/anxious.      Objective:     Vital Signs (Most Recent):  Temp: 98.2 °F (36.8 °C) (07/22/17 0821)  Pulse: 75 (07/22/17 0821)  Resp: 17 (07/22/17 0821)  BP: 120/72 (07/22/17  0821)  SpO2: 99 % (07/22/17 0858) Vital Signs (24h Range):  Temp:  [98.1 °F (36.7 °C)-98.3 °F (36.8 °C)] 98.2 °F (36.8 °C)  Pulse:  [75-98] 75  Resp:  [12-17] 17  SpO2:  [99 %-100 %] 99 %  BP: (118-136)/(66-79) 120/72     Weight: 74.8 kg (165 lb)  Body mass index is 25.09 kg/m².    Physical Exam   Constitutional: He is oriented to person, place, and time. He appears well-developed and well-nourished. No distress.   HENT:   Head: Normocephalic and atraumatic.   Mouth/Throat: No oropharyngeal exudate.   Eyes: Conjunctivae are normal. Pupils are equal, round, and reactive to light. No scleral icterus.   Neck: Neck supple. No tracheal deviation present.   Cardiovascular: Normal rate, regular rhythm, normal heart sounds and intact distal pulses.    No murmur heard.  Pulmonary/Chest: Effort normal and breath sounds normal. No respiratory distress. He has no wheezes. He has no rales. He exhibits no tenderness.   Abdominal: Soft. Bowel sounds are normal. He exhibits no distension and no mass. There is no tenderness.   Genitourinary:   Genitourinary Comments: Daily draining dark amberly urine   Musculoskeletal: He exhibits no edema or deformity.   Neurological: He is alert and oriented to person, place, and time.   Skin: Skin is warm and dry. No rash noted. He is not diaphoretic. No erythema. No pallor.   Psychiatric: He has a normal mood and affect. His behavior is normal.   Nursing note and vitals reviewed.       Significant Labs:   CBC:   Recent Labs  Lab 07/22/17  0419   WBC 8.02   HGB 17.5   HCT 48.2   *     CMP:   Recent Labs  Lab 07/22/17  0419   *   K 4.0   CL 72*   CO2 31*      *   CREATININE 7.4*   CALCIUM 10.5   PROT 9.8*   ALBUMIN 5.2   BILITOT 1.5*   ALKPHOS 75   AST 20   ALT 13   ANIONGAP 22*   EGFRNONAA 9*     Cardiac Markers: No results for input(s): CKMB, MYOGLOBIN, BNP, TROPISTAT in the last 48 hours.  Troponin:   Recent Labs  Lab 07/22/17  0431   TROPONINI <0.006     TSH:   Recent  Labs  Lab 07/22/17  0431   TSH 0.679     Urine Studies:   Recent Labs  Lab 07/22/17  0559   COLORU Yellow   APPEARANCEUA Clear   PHUR 6.0   SPECGRAV 1.015   PROTEINUA 1+*   GLUCUA Negative   KETONESU Negative   BILIRUBINUA Negative   OCCULTUA 1+*   NITRITE Negative   UROBILINOGEN Negative   LEUKOCYTESUR Negative   RBCUA 1   WBCUA 1   BACTERIA Occasional   SQUAMEPITHEL 0   HYALINECASTS 5*       Significant Imaging: I have reviewed all pertinent imaging results/findings within the past 24 hours.   Imaging Results          X-Ray Chest 1 View (In process)

## 2017-07-22 NOTE — HPI
Ernst Su is a 32-year-old  male with no significant past medical history. He has never really been evaluated by a doctor except for small injuries such as a recent laceration. He speaks very little English. He works as a  and lives with 2 other people.  He is NOT a legal resident.  My assessment was done with the assistance of Thai Language  # [id number] and with ED records.    He presented to [facility] emergency department for evaluation of midepigastric abdominal pain associated with nausea and vomiting for the past 4 days.  He denies any bloody emesis or coffee-ground emesis.  He drinks 2 beers a day but denies any other excessive alcohol intake. He has no history of similar symptoms.  He states he's been vomiting about 15 times a day but denies any diarrhea.  He usually drinks about 7 bottles of water per day while working, but he has not been able to drink much due to his nausea and vomiting.  He states that he has only urinated once per day for the past 3 days.  But he usually urinates several times per day. He has no chest pain flank pain radiation of pain to the back. He has mild urinary hesitancy and that it burns when he urinates. His post-void residual was approximately 240, serna placed in the ED. He was found to have a Sodium of 125, Potassium 4.0, Chloride 72, CO2 31, Anion Gap 22, , Creatinine 7.4, TBili 1.5, .  His troponin and TSH are Normal. He is admitted to [facility] for Acute [facility]ey Injury and nephrology will be consulted.  He was given 2 liters of fluids in the ED,as well as famotidine 20 mg and ondansetron.  He now states that his abdominal pain and nausea and vomiting are gone.  He is hoping to leave the hospital. With the help of the language line, I stressed the importance of staying for complete workup and treatment to avoid permanent kidney damage. He is agreeable.

## 2017-07-22 NOTE — ED NOTES
line used for translation. Educated pt. on medication indication, AE, SE, reactions and expected outcomes. Pt. verbalizes understanding. Agrees to treatment plan. Denies allergy. Will continue to monitor.

## 2017-07-22 NOTE — ED NOTES
Pt. denies CP or SOB at present time. Denies epigastric pain. Denies abd pain. Denies n.v. No vomiting episodes since administration of nausea medication. Reports improvement from treatment. AAOx4. NAD.

## 2017-07-22 NOTE — ASSESSMENT & PLAN NOTE
Intractable Nausea and Vomiting  Patient with no known past medical history in with 4 days of abdominal pain with nausea and vomiting up to 15 times per day.  Denies fever, chills, diarrhea.  He feels that onset of illness may have been from food he ate from a food truck.  The symptoms had now resolved with IV fluids, oral famotidine, and SL ondansetron 8 mg.

## 2017-07-22 NOTE — H&P
"[facility] Medicine  History & Physical    Patient Name: Ernst Su  MRN: [id number]  Admission Date: 7/22/2017  Attending Physician: Jeremie Pena MD   Primary Care Provider: No primary care provider on file.       Assisted by Georgian Language  # [id number].  Patient information was obtained from patient and ER records.     Subjective:     Principal Problem:HAL (acute kidney injury)    Chief Complaint:   Chief Complaint   Patient presents with    Emesis     X 4 days. multiple times each day. States "I can't eat/ drink anything because it comes back up"; reports abd pain. reports vomiting has made pain worse.         HPI: Ernst Su is a 32-year-old  male with no significant past medical history. He has never really been evaluated by a doctor except for small injuries such as a recent laceration. He speaks very little English. He works as a  and lives with 2 other people.  He is NOT a legal resident.  My assessment was done with the assistance of Georgian Language  # [id number] and with ED records.    He presented to [facility] emergency department for evaluation of midepigastric abdominal pain associated with nausea and vomiting for the past 4 days.  He denies any bloody emesis or coffee-ground emesis.  He drinks 2 beers a day but denies any other excessive alcohol intake. He has no history of similar symptoms.  He states he's been vomiting about 15 times a day but denies any diarrhea.  He usually drinks about 7 bottles of water per day while working, but he has not been able to drink much due to his nausea and vomiting.  He states that he has only urinated once per day for the past 3 days.  But he usually urinates several times per day. He has no chest pain flank pain radiation of pain to the back. He has mild urinary hesitancy and that it burns when he urinates. His post-void residual was approximately 240, serna placed in the ED. " He was found to have a Sodium of 125, Potassium 4.0, Chloride 72, CO2 31, Anion Gap 22, , Creatinine 7.4, TBili 1.5, .  His troponin and TSH are Normal. He is admitted to [facility] for Acute Kindey Injury and nephrology will be consulted.  He was given 2 liters of fluids in the ED,as well as famotidine 20 mg and ondansetron.  He now states that his abdominal pain and nausea and vomiting are gone.  He is hoping to leave the hospital. With the help of the language line, I stressed the importance of staying for complete workup and treatment to avoid permanent kidney damage. He is agreeable.         History reviewed. No pertinent past medical history.    History reviewed. No pertinent surgical history.    Review of patient's allergies indicates:  No Known Allergies    No current facility-administered medications on file prior to encounter.      Current Outpatient Prescriptions on File Prior to Encounter   Medication Sig    hydrocodone-acetaminophen 5-325mg (NORCO) 5-325 mg per tablet Take 1 tablet by mouth every 6 (six) hours as needed for Pain.     Family History     None        Social History Main Topics    Smoking status: Current Some Day Smoker    Smokeless tobacco: Not on file    Alcohol use Not on file    Drug use: Unknown    Sexual activity: Not on file     Review of Systems   Constitutional: Positive for appetite change. Negative for chills, diaphoresis, fever and unexpected weight change.   HENT: Negative for congestion, sore throat, trouble swallowing and voice change.    Eyes: Negative for photophobia and visual disturbance.   Respiratory: Negative for cough, shortness of breath and wheezing.    Cardiovascular: Negative for chest pain, palpitations and leg swelling.   Gastrointestinal: Positive for abdominal pain, nausea and vomiting. Negative for abdominal distention, blood in stool, constipation and diarrhea.   Endocrine: Negative for polydipsia, polyphagia and polyuria.    Genitourinary: Positive for decreased urine volume and difficulty urinating. Negative for hematuria and urgency.   Musculoskeletal: Negative for back pain, joint swelling, neck pain and neck stiffness.   Skin: Negative for color change, rash and wound.   Neurological: Negative for speech difficulty, weakness and headaches.   Psychiatric/Behavioral: Negative for agitation, decreased concentration and sleep disturbance. The patient is not nervous/anxious.      Objective:     Vital Signs (Most Recent):  Temp: 98.2 °F (36.8 °C) (07/22/17 0821)  Pulse: 75 (07/22/17 0821)  Resp: 17 (07/22/17 0821)  BP: 120/72 (07/22/17 0821)  SpO2: 99 % (07/22/17 0858) Vital Signs (24h Range):  Temp:  [98.1 °F (36.7 °C)-98.3 °F (36.8 °C)] 98.2 °F (36.8 °C)  Pulse:  [75-98] 75  Resp:  [12-17] 17  SpO2:  [99 %-100 %] 99 %  BP: (118-136)/(66-79) 120/72     Weight: 74.8 kg (165 lb)  Body mass index is 25.09 kg/m².    Physical Exam   Constitutional: He is oriented to person, place, and time. He appears well-developed and well-nourished. No distress.   HENT:   Head: Normocephalic and atraumatic.   Mouth/Throat: No oropharyngeal exudate.   Eyes: Conjunctivae are normal. Pupils are equal, round, and reactive to light. No scleral icterus.   Neck: Neck supple. No tracheal deviation present.   Cardiovascular: Normal rate, regular rhythm, normal heart sounds and intact distal pulses.    No murmur heard.  Pulmonary/Chest: Effort normal and breath sounds normal. No respiratory distress. He has no wheezes. He has no rales. He exhibits no tenderness.   Abdominal: Soft. Bowel sounds are normal. He exhibits no distension and no mass. There is no tenderness.   Genitourinary:   Genitourinary Comments: Daily draining dark amberly urine   Musculoskeletal: He exhibits no edema or deformity.   Neurological: He is alert and oriented to person, place, and time.   Skin: Skin is warm and dry. No rash noted. He is not diaphoretic. No erythema. No pallor.    Psychiatric: He has a normal mood and affect. His behavior is normal.   Nursing note and vitals reviewed.       Significant Labs:   CBC:   Recent Labs  Lab 07/22/17 0419   WBC 8.02   HGB 17.5   HCT 48.2   *     CMP:   Recent Labs  Lab 07/22/17  0419   *   K 4.0   CL 72*   CO2 31*      *   CREATININE 7.4*   CALCIUM 10.5   PROT 9.8*   ALBUMIN 5.2   BILITOT 1.5*   ALKPHOS 75   AST 20   ALT 13   ANIONGAP 22*   EGFRNONAA 9*     Cardiac Markers: No results for input(s): CKMB, MYOGLOBIN, BNP, TROPISTAT in the last 48 hours.  Troponin:   Recent Labs  Lab 07/22/17  0431   TROPONINI <0.006     TSH:   Recent Labs  Lab 07/22/17  0431   TSH 0.679     Urine Studies:   Recent Labs  Lab 07/22/17  0559   COLORU Yellow   APPEARANCEUA Clear   PHUR 6.0   SPECGRAV 1.015   PROTEINUA 1+*   GLUCUA Negative   KETONESU Negative   BILIRUBINUA Negative   OCCULTUA 1+*   NITRITE Negative   UROBILINOGEN Negative   LEUKOCYTESUR Negative   RBCUA 1   WBCUA 1   BACTERIA Occasional   SQUAMEPITHEL 0   HYALINECASTS 5*       Significant Imaging: I have reviewed all pertinent imaging results/findings within the past 24 hours.   Imaging Results          X-Ray Chest 1 View (In process)                     Assessment/Plan:     * HAL (acute kidney injury)    Hyponatremia, hypochloremia, Elevated CPK  He is dehydrated, as he works as  and has been unable to drinks fluids d/t N/V for 4 days.  He noticed and acute decrease in urine volume, urinating only once per day for the past 3 days. He denies Flank Pian, so likely not hydronephrosis. He did have a Post Void Residual of 240. So serna was placed in the ED.  Sodium 125, Chloride 72,  and Creatine 7.4, , tBili 1.5.   Urinalysis does not show signs of UTI, but has 1+ Protein with Hylan and Granular Casts.  --He was given 2 liters Normal Saline. Continue @ 150 ml/hr  --Renal US  --Nephrology Consult  --Strict Intake and Output  --Avoid nephrotoxins and renally  dose meds                 Epigastric pain    Intractable Nausea and Vomiting  Patient with no known past medical history in with 4 days of abdominal pain with nausea and vomiting up to 15 times per day.  Denies fever, chills, diarrhea.  He feels that onset of illness may have been from food he ate from a food truck.  The symptoms had now resolved with IV fluids, oral famotidine, and SL ondansetron 8 mg.           VTE Risk Mitigation         Ordered     Low Risk of VTE  Once      07/22/17 0801        Norma Samaniego NP  Department of Hospital Medicine   [facility]

## 2017-07-22 NOTE — ED NOTES
Bed rails are up and call light is within patient reach. Pillow and blanket provided. Emesis bag at bedside. Lights off in room. NAD.

## 2017-07-22 NOTE — PROGRESS NOTES
Pt arrived to unit from ED via wheelchair. AAOx4. VSS. NAD noted. Tele initiated on pt, per MD order. IVF started @ 150. Bedrails up x 2. Bed low and locked. Fall precautions maintained. See full documented assessment on pertinent flowsheets. Will continue to monitor.

## 2017-07-23 VITALS
HEART RATE: 88 BPM | WEIGHT: 142.19 LBS | BODY MASS INDEX: 21.55 KG/M2 | OXYGEN SATURATION: 100 % | RESPIRATION RATE: 16 BRPM | TEMPERATURE: 98 F | SYSTOLIC BLOOD PRESSURE: 108 MMHG | DIASTOLIC BLOOD PRESSURE: 60 MMHG | HEIGHT: 68 IN

## 2017-07-23 PROBLEM — R10.13 EPIGASTRIC PAIN: Status: RESOLVED | Noted: 2017-07-22 | Resolved: 2017-07-23

## 2017-07-23 PROBLEM — E87.8 HYPOCHLOREMIA: Status: RESOLVED | Noted: 2017-07-22 | Resolved: 2017-07-23

## 2017-07-23 PROBLEM — R11.2 INTRACTABLE VOMITING WITH NAUSEA: Status: RESOLVED | Noted: 2017-07-22 | Resolved: 2017-07-23

## 2017-07-23 PROBLEM — E83.39 HYPOPHOSPHATEMIA: Status: ACTIVE | Noted: 2017-07-23

## 2017-07-23 PROBLEM — R74.8 ELEVATED CPK: Status: RESOLVED | Noted: 2017-07-22 | Resolved: 2017-07-23

## 2017-07-23 PROBLEM — N17.9 AKI (ACUTE KIDNEY INJURY): Status: RESOLVED | Noted: 2017-07-22 | Resolved: 2017-07-23

## 2017-07-23 LAB
ALBUMIN SERPL BCP-MCNC: 3.5 G/DL
ALP SERPL-CCNC: 58 U/L
ALT SERPL W/O P-5'-P-CCNC: 14 U/L
ANION GAP SERPL CALC-SCNC: 7 MMOL/L
AST SERPL-CCNC: 19 U/L
BASOPHILS # BLD AUTO: 0.04 K/UL
BASOPHILS NFR BLD: 0.8 %
BILIRUB SERPL-MCNC: 1.2 MG/DL
BUN SERPL-MCNC: 29 MG/DL
CALCIUM SERPL-MCNC: 9.2 MG/DL
CHLORIDE SERPL-SCNC: 95 MMOL/L
CHOLEST/HDLC SERPL: 4.5 {RATIO}
CO2 SERPL-SCNC: 30 MMOL/L
CREAT SERPL-MCNC: 0.9 MG/DL
DIFFERENTIAL METHOD: ABNORMAL
EOSINOPHIL # BLD AUTO: 0.1 K/UL
EOSINOPHIL NFR BLD: 2.1 %
ERYTHROCYTE [DISTWIDTH] IN BLOOD BY AUTOMATED COUNT: 12.5 %
EST. GFR  (AFRICAN AMERICAN): >60 ML/MIN/1.73 M^2
EST. GFR  (NON AFRICAN AMERICAN): >60 ML/MIN/1.73 M^2
GLUCOSE SERPL-MCNC: 84 MG/DL
HCT VFR BLD AUTO: 39.7 %
HDL/CHOLESTEROL RATIO: 22.2 %
HDLC SERPL-MCNC: 144 MG/DL
HDLC SERPL-MCNC: 32 MG/DL
HGB BLD-MCNC: 13.5 G/DL
LDLC SERPL CALC-MCNC: 81 MG/DL
LYMPHOCYTES # BLD AUTO: 2.2 K/UL
LYMPHOCYTES NFR BLD: 42.5 %
MAGNESIUM SERPL-MCNC: 2.1 MG/DL
MCH RBC QN AUTO: 30.8 PG
MCHC RBC AUTO-ENTMCNC: 34 G/DL
MCV RBC AUTO: 90 FL
MONOCYTES # BLD AUTO: 1 K/UL
MONOCYTES NFR BLD: 19.2 %
NEUTROPHILS # BLD AUTO: 1.9 K/UL
NEUTROPHILS NFR BLD: 35.2 %
NONHDLC SERPL-MCNC: 112 MG/DL
PHOSPHATE SERPL-MCNC: 1.9 MG/DL
PLATELET # BLD AUTO: 299 K/UL
PMV BLD AUTO: 9.3 FL
POTASSIUM SERPL-SCNC: 4.2 MMOL/L
PROT SERPL-MCNC: 6.6 G/DL
RBC # BLD AUTO: 4.39 M/UL
SODIUM SERPL-SCNC: 132 MMOL/L
TRIGL SERPL-MCNC: 155 MG/DL
WBC # BLD AUTO: 5.25 K/UL

## 2017-07-23 PROCEDURE — 83735 ASSAY OF MAGNESIUM: CPT

## 2017-07-23 PROCEDURE — 25000003 PHARM REV CODE 250: Performed by: NURSE PRACTITIONER

## 2017-07-23 PROCEDURE — 85025 COMPLETE CBC W/AUTO DIFF WBC: CPT

## 2017-07-23 PROCEDURE — 84100 ASSAY OF PHOSPHORUS: CPT

## 2017-07-23 PROCEDURE — 80053 COMPREHEN METABOLIC PANEL: CPT

## 2017-07-23 PROCEDURE — 94761 N-INVAS EAR/PLS OXIMETRY MLT: CPT

## 2017-07-23 PROCEDURE — 80061 LIPID PANEL: CPT

## 2017-07-23 PROCEDURE — 36415 COLL VENOUS BLD VENIPUNCTURE: CPT

## 2017-07-23 PROCEDURE — 25000003 PHARM REV CODE 250: Performed by: INTERNAL MEDICINE

## 2017-07-23 RX ADMIN — SODIUM CHLORIDE AND POTASSIUM CHLORIDE: 9; 1.49 INJECTION, SOLUTION INTRAVENOUS at 04:07

## 2017-07-23 RX ADMIN — FAMOTIDINE 20 MG: 20 TABLET ORAL at 08:07

## 2017-07-23 NOTE — PROGRESS NOTES
Pt's daphne hanna, per MD order. Pt tolerated procedure well. Instructed to call when he urinates on his own. Will continue to monitor.

## 2017-07-23 NOTE — PROGRESS NOTES
Pt discharge to home. Pt AAOx3. No HH or DME needs noted. TN provided pt with [facility] info  And assisted RN with d/c instructions in [place].

## 2017-07-23 NOTE — PROGRESS NOTES
Pt AAOx4. VSS. NAD noted. Discharge teaching given. Pt verbalized understanding. All questions answered. Pt d/c'd per protocol.

## 2017-07-23 NOTE — PLAN OF CARE
Re:  Ernst Su, WORK / SCHOOL EXCUSE    Date: 07/23/2017           [facility]       Jeremie Pena MD, [facility]       MD Lg Martínez FNP Renee Melancon, PA-C Rosanne Zeringue, NP  [address] [place] [address]  Office: [phone]  Fax: [phone]     To whom it may concern:    Mr. Ernst Su has been hospitalized at the [facility] since 7/22/2017.  He has been sick since 7/19/2017. Please excuse the patient from duties.  Patient may return on Tuesday, 7/25/2107.  No restrictions.     Please contact me if you have any questions.          Jeremie Pena MD

## 2017-07-23 NOTE — PLAN OF CARE
Problem: Patient Care Overview  Goal: Plan of Care Review  Outcome: Ongoing (interventions implemented as appropriate)  Pt on RA spo2 98 %.  No respiratory distress.  Will continue to monitor.

## 2017-07-24 NOTE — DISCHARGE SUMMARY
[facility] Hospital Medicine  Discharge Summary      Patient Name: Ernst Su  MRN: [id number]  Admission Date: 7/22/2017  Hospital Length of Stay: 1 days  Discharge Date and Time: 7/23/2017  3:29 PM  Attending Physician: Jeremie Pena MD   Discharging Provider: Jeremie Pena MD  Primary Care Provider: No primary care provider on file.      HPI:   Ernst Su is a 32-year-old  male with no significant past medical history. He has never really been evaluated by a doctor except for small injuries such as a recent laceration. He speaks very little English. He works as a  and lives with 2 other people.  He is NOT a legal resident.  My assessment was done with the assistance of [employer]  # [id number] and with ED records.    He presented to [facility] emergency department for evaluation of midepigastric abdominal pain associated with nausea and vomiting for the past 4 days.  He denies any bloody emesis or coffee-ground emesis.  He drinks 2 beers a day but denies any other excessive alcohol intake. He has no history of similar symptoms.  He states he's been vomiting about 15 times a day but denies any diarrhea.  He usually drinks about 7 bottles of water per day while working, but he has not been able to drink much due to his nausea and vomiting.  He states that he has only urinated once per day for the past 3 days.  But he usually urinates several times per day. He has no chest pain flank pain radiation of pain to the back. He has mild urinary hesitancy and that it burns when he urinates. His post-void residual was approximately 240, serna placed in the ED. He was found to have a Sodium of 125, Potassium 4.0, Chloride 72, CO2 31, Anion Gap 22, , Creatinine 7.4, TBili 1.5, .  His troponin and TSH are Normal. He is admitted to [facility] for Acute Kindey Injury and nephrology will be consulted.  He was given 2 liters of fluids in the ED,as  well as famotidine 20 mg and ondansetron.  He now states that his abdominal pain and nausea and vomiting are gone.  He is hoping to leave the hospital. With the help of the language line, I stressed the importance of staying for complete workup and treatment to avoid permanent kidney damage. He is agreeable.         * No surgery found *      Indwelling Lines/Drains at time of discharge:   Lines/Drains/Airways          No matching active lines, drains, or airways        Hospital Course:   Mr. Su was admitted to [facility] for continued care. He was started on IV fluids and Nephrology was consulted. He had a renal US that was normal. His renal function improved with the IV fluids and his creatinine is back to normal. He was tolerating a regular diet without any nausea or vomiting.       Daily Note:  Feeling back to normal today. Urinated after the serna was removed. Ready to go home.      Consults:   Consults         Status Ordering Provider     Inpatient consult to [facility] (Tremaine Mcguire Nimkevych)  Once     Provider:  Aimee Garcia MD    Completed JOHN GUY          Significant Diagnostic Studies: Labs:   BMP:   Recent Labs  Lab 07/22/17  0419 07/22/17  1242 07/23/17  0547    95 84   * 130* 132*   K 4.0 3.6 4.2   CL 72* 86* 95   CO2 31* 32* 30*   * 66* 29*   CREATININE 7.4* 2.9* 0.9   CALCIUM 10.5 9.8 9.2   MG  --  2.7* 2.1     Radiology: Renal Ultrasound: No acute abnormality.    Pending Diagnostic Studies:     Procedure Component Value Units Date/Time    Immunofixation, Urine  Random [[id number]] Collected:  07/22/17 1556    Order Status:  Sent Lab Status:  In process Updated:  07/22/17 2040    Specimen:  Urine from Urine, Catheterized         Final Active Diagnoses:    Diagnosis Date Noted POA    Hypophosphatemia [E83.39] 07/23/2017 No    Hyponatremia [E87.1] 07/22/2017 Yes      Problems Resolved During this Admission:    Diagnosis Date Noted Date  Resolved POA    PRINCIPAL PROBLEM:  HAL (acute kidney injury) [N17.9] 07/22/2017 07/23/2017 Yes    Epigastric pain [R10.13] 07/22/2017 07/23/2017 Yes    Intractable vomiting with nausea [R11.2] 07/22/2017 07/23/2017 Yes    Hypochloremia [E87.8] 07/22/2017 07/23/2017 Yes    Elevated CPK [R74.8] 07/22/2017 07/23/2017 Yes      No new Assessment & Plan notes have been filed under this hospital service since the last note was generated.  Service: Hospital Medicine      Discharged Condition: good    Disposition: Home or Self Care    Follow Up:  Follow-up Information     [facility].    Specialties:  Child and Adolescent Psychiatry, Psychology, Family Medicine, Obstetrics  Why:  As needed  Contact information:  [address]  [address]  [place] [address]  [phone]                 Patient Instructions:     Diet general     Activity as tolerated     Call MD for:  persistent nausea and vomiting or diarrhea     Call MD for:  temperature >100.4     Call MD for:  severe persistent headache     Call MD for:  persistent dizziness, light-headedness, or visual disturbances     Call MD for:  increased confusion or weakness       Medications:  Reconciled Home Medications:   Discharge Medication List as of 7/23/2017  3:05 PM      CONTINUE these medications which have NOT CHANGED    Details   hydrocodone-acetaminophen 5-325mg (NORCO) 5-325 mg per tablet Take 1 tablet by mouth every 6 (six) hours as needed for Pain., Historical Med           Time spent on the discharge of patient: 35 minutes    Jeremie Pena MD  Department of Hospital Medicine  [facility]

## 2017-07-24 NOTE — HOSPITAL COURSE
Mr. Su was admitted to [facility] for continued care. He was started on IV fluids and Nephrology was consulted. He had a renal US that was normal. His renal function improved with the IV fluids and his creatinine is back to normal. He was tolerating a regular diet without any nausea or vomiting.       Daily Note:  Feeling back to normal today. Urinated after the serna was removed. Ready to go home.

## 2017-07-25 LAB — INTERPRETATION UR IFE-IMP: NORMAL
